# Patient Record
(demographics unavailable — no encounter records)

---

## 2024-11-07 NOTE — PHYSICAL EXAM
[Right] : right foot and ankle [5___] : eversion 5[unfilled]/5 [2+] : dorsalis pedis pulse: 2+ [] : patient ambulates without assistive device [FreeTextEntry3] : mild swelling diffuse skin color change [FreeTextEntry8] : no sig point ttp [TWNoteComboBox7] : dorsiflexion 10 degrees [de-identified] : plantar flexion 30 degrees [de-identified] : inversion 20 degrees [de-identified] : eversion 15 degrees

## 2024-11-07 NOTE — HISTORY OF PRESENT ILLNESS
[Work related] : work related [Sharp] : sharp [Intermittent] : intermittent [Work] : work [Rest] : rest [Walking] : walking [Not working due to injury] : Work status: not working due to injury [de-identified] : WC DOI: 11/07/23 04/09/2024: foot fell through floor 11/7/23 w/ ongoing ankle and knee pain. treated in boot initially. has been going to PT. no prior foot probs. denies dm/tob. ANNA pino -- out from work currently  04/24/2024:  no change. here to review mri images. walking in boot. out from work  05/15/2024:  no improvement. no relief from boot. now walking in reg shoes. not working. having locking at times  06/19/2024:  R ankle scope, excision osteochondral defect  06/19/2024:  no complaints. nwb in splint. Patient denies fevers, chills, or drainage.  07/03/2024: improving. weight bearing at times. Patient denies fevers, chills, or drainage. taking asa.   07/24/2024:  improving.  Attending PT.  PWB in boot w/ crutches. Patient denies fevers, chills, or drainage.  08/22/2024:  no sig change. using brace/crutches. going to PT. Patient denies fevers, chills, or drainage.  9/19/2024: Improving. walking in brace w/ cane. going to PT. working light duty. Patient denies fevers, chills, or drainage.  11/07/2024:  improving.  Attending PT.  Walking in brace.  Light duty at work. going to PT. using cane at times [] : Post Surgical Visit: no [FreeTextEntry1] : R ankle  [FreeTextEntry3] : 11/07/223 [de-identified] :

## 2024-11-07 NOTE — ASSESSMENT
[FreeTextEntry1] : wbat -- discussed disuse osteopenia aircast continue PT rec pain management eval for crps working light duty f/up 6 wks w/ ankle xray

## 2024-11-07 NOTE — WORK
[Sprain/Strain] : sprain/strain [Was the competent medical cause of the injury] : was the competent medical cause of the injury [Are consistent with the injury] : are consistent with the injury [Consistent with my objective findings] : consistent with my objective findings [Partial] : partial [Does not reveal pre-existing condition(s) that may affect treatment/prognosis] : does not reveal pre-existing condition(s) that may affect treatment/prognosis [Other: ___] : [unfilled] [Can return to work with limitations on ______] : can return to work with limitations on [unfilled] [N/A] : : Not Applicable [Patient] : patient [No Rx restrictions] : No Rx restrictions. [I provided the services listed above] :  I provided the services listed above. [FreeTextEntry1] : good

## 2024-12-10 NOTE — HISTORY OF PRESENT ILLNESS
[Work related] : work related [Burning] : burning [Dull/Aching] : dull/aching [Sharp] : sharp [Tingling] : tingling [] : yes [Constant] : constant [Household chores] : household chores [Social interactions] : social interactions [FreeTextEntry1] : rt ankle  [FreeTextEntry3] : 11/7/23 [FreeTextEntry7] : big toe  [de-identified] : touching ankle

## 2024-12-10 NOTE — DISCUSSION/SUMMARY
[de-identified] : After discussing various treatment options with the patient including but not limited to oral medications, physical therapy, exercise modalities as well as interventional spinal injections, we have decided with the following plan:  - Continue home exercises, stretching, activity modification, physical therapy, and conservative care. - Follow-up as needed. - Recommend Gabapentin 300mg BID. Recommend to titrate up gabapentin slowly - first take one pill at night for 3 days and then increase to twice daily. Pt instructed not to drive or operate heavy machinery while on medications. - Recommend Vitamin C 500mg once a day for suspicion of CRPS. - Will be having EMG this week.  - Discussed possible lumbar sympathetic block.

## 2024-12-10 NOTE — PHYSICAL EXAM
[de-identified] : Constitutional; Appears well, no apparent distress Ability to communicate: Normal  Respiratory: non-labored breathing Skin: No rash noted Head: Normocephalic, atraumatic Neck: no visible thyroid enlargement Eyes: Extraocular movements intact Neurologic: Alert and oriented x3 Psychiatric: normal mood, affect and behavior Right Foot: no sweeling, no erythemia, No temperature change, no trophic change, +allodynia

## 2024-12-10 NOTE — PHYSICAL EXAM
[de-identified] : Constitutional; Appears well, no apparent distress Ability to communicate: Normal  Respiratory: non-labored breathing Skin: No rash noted Head: Normocephalic, atraumatic Neck: no visible thyroid enlargement Eyes: Extraocular movements intact Neurologic: Alert and oriented x3 Psychiatric: normal mood, affect and behavior Right Foot: no sweeling, no erythemia, No temperature change, no trophic change, +allodynia

## 2024-12-10 NOTE — HISTORY OF PRESENT ILLNESS
[Work related] : work related [Burning] : burning [Dull/Aching] : dull/aching [Sharp] : sharp [Tingling] : tingling [] : yes [Constant] : constant [Household chores] : household chores [Social interactions] : social interactions [FreeTextEntry1] : rt ankle  [FreeTextEntry3] : 11/7/23 [FreeTextEntry7] : big toe  [de-identified] : touching ankle

## 2024-12-10 NOTE — DISCUSSION/SUMMARY
[de-identified] : After discussing various treatment options with the patient including but not limited to oral medications, physical therapy, exercise modalities as well as interventional spinal injections, we have decided with the following plan:  - Continue home exercises, stretching, activity modification, physical therapy, and conservative care. - Follow-up as needed. - Recommend Gabapentin 300mg BID. Recommend to titrate up gabapentin slowly - first take one pill at night for 3 days and then increase to twice daily. Pt instructed not to drive or operate heavy machinery while on medications. - Recommend Vitamin C 500mg once a day for suspicion of CRPS. - Will be having EMG this week.  - Discussed possible lumbar sympathetic block.

## 2024-12-19 NOTE — ASSESSMENT
[FreeTextEntry1] : wbat  [employer] continue PT seeing pain management, considering possible injection going to PT light duty

## 2024-12-19 NOTE — PHYSICAL EXAM
[Right] : right foot and ankle [5___] : eversion 5[unfilled]/5 [2+] : dorsalis pedis pulse: 2+ [] : no calf tenderness [FreeTextEntry3] : mild swelling diffuse skin color change [FreeTextEntry8] : no sig point ttp [TWNoteComboBox7] : dorsiflexion 10 degrees [de-identified] : plantar flexion 30 degrees [de-identified] : inversion 20 degrees [de-identified] : eversion 15 degrees

## 2024-12-19 NOTE — HISTORY OF PRESENT ILLNESS
[Work related] : work related [Sharp] : sharp [Intermittent] : intermittent [Work] : work [Rest] : rest [Walking] : walking [Not working due to injury] : Work status: not working due to injury [de-identified] : WC DOI: 11/07/23 04/09/2024: foot fell through floor 11/7/23 w/ ongoing ankle and knee pain. treated in boot initially. has been going to PT. no prior foot probs. denies dm/tob. ANNA pino -- out from work currently  04/24/2024:  no change. here to review mri images. walking in boot. out from work  05/15/2024:  no improvement. no relief from boot. now walking in reg shoes. not working. having locking at times  06/19/2024:  R ankle scope, excision osteochondral defect  06/19/2024:  no complaints. nwb in splint. Patient denies fevers, chills, or drainage.  07/03/2024: improving. weight bearing at times. Patient denies fevers, chills, or drainage. taking asa.   07/24/2024:  improving.  Attending PT.  PWB in boot w/ crutches. Patient denies fevers, chills, or drainage.  08/22/2024:  no sig change. using brace/crutches. going to PT. Patient denies fevers, chills, or drainage.  9/19/2024: Improving. walking in brace w/ cane. going to PT. working light duty. Patient denies fevers, chills, or drainage.  11/07/2024:  improving.  Attending PT.  Walking in brace.  Light duty at work. going to PT. using cane at times  12/19/2024: mild improvement. walking in regular shoes w/ brace. saw pain management -- started gabapentin, but making drowsy. had emg that showed. mild peroneal neuropathy. light duty. going to PT.  [] : Post Surgical Visit: no [FreeTextEntry1] : R ankle  [FreeTextEntry3] : 11/07/223 [de-identified] :

## 2025-02-27 NOTE — PHYSICAL EXAM
[Right] : right foot and ankle [5___] : eversion 5[unfilled]/5 [2+] : dorsalis pedis pulse: 2+ [] : no calf tenderness [NL (40)] : plantar flexion 40 degrees [FreeTextEntry3] : min swelling diffuse skin color change [FreeTextEntry8] : mild anterolat ttp [TWNoteComboBox7] : dorsiflexion 15 degrees [de-identified] : inversion 20 degrees [de-identified] : eversion 15 degrees

## 2025-02-27 NOTE — ASSESSMENT
[FreeTextEntry1] : wbat  brace for comfort continue PT seeing pain management f/up 8 wks ' trial of voltaren  The patient's current medication management of their orthopedic diagnosis was reviewed today:  (1) We discussed a comprehensive treatment plan that included possible pharmaceutical management involving the use of prescription strength medications including but not limited to options such as oral Naprosyn 500mg BID, once daily Meloxicam 15 mg, or 500-650 mg Tylenol versus over the counter oral medications and topical prescription NSAID Pennsaid vs over the counter Voltaren gel. (2) There is a moderate risk of morbidity with further treatment, especially from use of prescription strength medications and possible side effects of these medications which include upset stomach with oral medications, skin reactions to topical medications and cardiac/renal issues with long term use. (3) I recommended that the patient follow-up with their medical physician to discuss any significant specific potential issues with long term medication use such as interactions with current medications or with exacerbation of underlying medical comorbidities. (4) The benefits and risks associated with use of injectable, oral or topical, prescription and over the counter anti-inflammatory medications were discussed with the patient. The patient voiced understanding of the risks including but not limited to bleeding, stroke, kidney dysfunction, heart disease, and were referred to the black box warning label for further information.

## 2025-02-27 NOTE — HISTORY OF PRESENT ILLNESS
[Work related] : work related [Sharp] : sharp [Intermittent] : intermittent [Work] : work [Rest] : rest [Walking] : walking [Not working due to injury] : Work status: not working due to injury [de-identified] : WC DOI: 11/07/23 04/09/2024: foot fell through floor 11/7/23 w/ ongoing ankle and knee pain. treated in boot initially. has been going to PT. no prior foot probs. denies dm/tob. ANNA pino -- out from work currently  04/24/2024:  no change. here to review mri images. walking in boot. out from work  05/15/2024:  no improvement. no relief from boot. now walking in reg shoes. not working. having locking at times  06/19/2024:  R ankle scope, excision osteochondral defect  06/19/2024:  no complaints. nwb in splint. Patient denies fevers, chills, or drainage.  07/03/2024: improving. weight bearing at times. Patient denies fevers, chills, or drainage. taking asa.   07/24/2024:  improving.  Attending PT.  PWB in boot w/ crutches. Patient denies fevers, chills, or drainage.  08/22/2024:  no sig change. using brace/crutches. going to PT. Patient denies fevers, chills, or drainage.  9/19/2024: Improving. walking in brace w/ cane. going to PT. working light duty. Patient denies fevers, chills, or drainage.  11/07/2024:  improving.  Attending PT.  Walking in brace.  Light duty at work. going to PT. using cane at times  12/19/2024: mild improvement. walking in regular shoes w/ brace. saw pain management -- started gabapentin, but making drowsy. had emg that showed. mild peroneal neuropathy. light duty. going to PT.   02/27/2025: some improvement. going to PT. no longer taking gabapentin. light duty [] : Post Surgical Visit: no [FreeTextEntry1] : R ankle  [FreeTextEntry3] : 11/07/223 [de-identified] :

## 2025-03-27 NOTE — ASSESSMENT
[FreeTextEntry1] : appears improved on mri rec f/up pain management for ongoing nerve symptoms may increase activity as cynthia remains light duty going to PT f/up 2 months

## 2025-03-27 NOTE — WORK
[Sprain/Strain] : sprain/strain [Was the competent medical cause of the injury] : was the competent medical cause of the injury [Are consistent with the injury] : are consistent with the injury [Consistent with my objective findings] : consistent with my objective findings [Partial] : partial [Does not reveal pre-existing condition(s) that may affect treatment/prognosis] : does not reveal pre-existing condition(s) that may affect treatment/prognosis [Can return to work with limitations on ______] : can return to work with limitations on [unfilled] [N/A] : : Not Applicable [Patient] : patient [No Rx restrictions] : No Rx restrictions. [I provided the services listed above] :  I provided the services listed above. [FreeTextEntry1] : good

## 2025-03-27 NOTE — DATA REVIEWED
[MRI] : MRI [Right] : of the right [Ankle] : ankle [I reviewed the films/CD and additionally noted] : I reviewed the films/CD and additionally noted [FreeTextEntry1] : resolution of ocd/marrow edema -- eliceoanger

## 2025-03-27 NOTE — PHYSICAL EXAM
[Right] : right foot and ankle [NL (40)] : plantar flexion 40 degrees [5___] : eversion 5[unfilled]/5 [2+] : dorsalis pedis pulse: 2+ [] : no calf tenderness [FreeTextEntry3] : min swelling diffuse skin color change [FreeTextEntry8] : mild anterolat ttp [TWNoteComboBox7] : dorsiflexion 15 degrees [de-identified] : inversion 20 degrees [de-identified] : eversion 15 degrees

## 2025-03-27 NOTE — HISTORY OF PRESENT ILLNESS
[Work related] : work related [Sharp] : sharp [Intermittent] : intermittent [Work] : work [Rest] : rest [Walking] : walking [Not working due to injury] : Work status: not working due to injury [de-identified] : WC DOI: 11/07/23 04/09/2024: foot fell through floor 11/7/23 w/ ongoing ankle and knee pain. treated in boot initially. has been going to PT. no prior foot probs. denies dm/tob. ANNA pino -- out from work currently  04/24/2024:  no change. here to review mri images. walking in boot. out from work  05/15/2024:  no improvement. no relief from boot. now walking in reg shoes. not working. having locking at times  06/19/2024:  R ankle scope, excision osteochondral defect  06/19/2024:  no complaints. nwb in splint. Patient denies fevers, chills, or drainage.  07/03/2024: improving. weight bearing at times. Patient denies fevers, chills, or drainage. taking asa.   07/24/2024:  improving.  Attending PT.  PWB in boot w/ crutches. Patient denies fevers, chills, or drainage.  08/22/2024:  no sig change. using brace/crutches. going to PT. Patient denies fevers, chills, or drainage.  9/19/2024: Improving. walking in brace w/ cane. going to PT. working light duty. Patient denies fevers, chills, or drainage.  11/07/2024:  improving.  Attending PT.  Walking in brace.  Light duty at work. going to PT. using cane at times  12/19/2024: mild improvement. walking in regular shoes w/ brace. saw pain management -- started gabapentin, but making drowsy. had emg that showed. mild peroneal neuropathy. light duty. going to PT.   02/27/2025: some improvement. going to PT. no longer taking gabapentin. light duty  03/27/2025:  mri f/up no sig change. walking in reg shoes. going to PT. working light duty. sx related to peroneal neuropathy [] : Post Surgical Visit: no [FreeTextEntry1] : R ankle  [FreeTextEntry3] : 11/07/223 [de-identified] :

## 2025-05-12 NOTE — ASSESSMENT
[FreeTextEntry1] : 50 year M presents with pain in the right foot consistent with CRPS potentially.  Right foot CRPS, would like to try conservative management first, can consider LSB in the future.  A discussion regarding available pain management treatment options occurred with the patient.  These included interventional, rehabilitative, pharmacological, and alternative modalities. We will proceed with the following:   Interventional treatment options: - None indicated at present time  - see additional instructions below      Rehabilitative options: - continue physical therapy - Initiate desensitization therapy - participation in active HEP was discussed and instructions provided   Medication based treatment options: - initiate trial of Lyrica 75mg BID - Initiate lidocaine ointment over the right foot   Complementary treatment options: - Weight management and lifestyle modifications discussed    Additional treatment recommendations as follows: - patient to follow up in four weeks  Patient is presenting with acute/sub-acute pain with impairment in ADLs and functionality. The pain has not responded to at least 6 weeks of conservative care including NSAID therapy, OTC analgesics and/or physical therapy and/or home exercise program within the last 3 months.   The risks, benefits and alternatives of medication were discussed with patient. Patient expressed understanding and would like to proceed.   The above documented care represents medical care services that are part of ongoing care related to this patient's serious or complex condition.

## 2025-05-12 NOTE — PHYSICAL EXAM
[de-identified] : General: Appears well nourished and well developed, no acute distress Musculoskeletal: Gait is non-antalgic, patient is ambulating without assistance Right Foot Exam:                       Inspection:    erythema (+)   edema (+)   rashes (-)                         ROM:    ROM - full range of motion with mild stiffness                           Strength Testin/5 in the bilateral lower extremities EXCEPT R EHL and Dorsiflexion 4/5                      Reflexes: 2/2 in the bilateral lower extremities                      Sensation: Sensation to light touch grossly intact in the bilateral lower extremities                      Special Tests:  +Edema, + Weakness, +Color changes, Decreased ROM, + Nail changes

## 2025-05-12 NOTE — HISTORY OF PRESENT ILLNESS
[Lower back] : lower back [Work related] : work related [6] : 6 [Burning] : burning [Dull/Aching] : dull/aching [Sharp] : sharp [Tingling] : tingling [] : yes [Intermittent] : intermittent [Household chores] : household chores [Social interactions] : social interactions [Standing] : standing [Walking] : walking [FreeTextEntry1] : rt ankle  [FreeTextEntry3] : 11/7/23 [FreeTextEntry7] : big toe  [de-identified] : touching ankle, driving

## 2025-05-12 NOTE — HISTORY OF PRESENT ILLNESS
[Lower back] : lower back [Work related] : work related [6] : 6 [Burning] : burning [Dull/Aching] : dull/aching [Sharp] : sharp [Tingling] : tingling [] : yes [Intermittent] : intermittent [Household chores] : household chores [Social interactions] : social interactions [Standing] : standing [Walking] : walking [FreeTextEntry1] : rt ankle  [FreeTextEntry3] : 11/7/23 [FreeTextEntry7] : big toe  [de-identified] : touching ankle, driving

## 2025-05-12 NOTE — PHYSICAL EXAM
[de-identified] : General: Appears well nourished and well developed, no acute distress Musculoskeletal: Gait is non-antalgic, patient is ambulating without assistance Right Foot Exam:                       Inspection:    erythema (+)   edema (+)   rashes (-)                         ROM:    ROM - full range of motion with mild stiffness                           Strength Testin/5 in the bilateral lower extremities EXCEPT R EHL and Dorsiflexion 4/5                      Reflexes: 2/2 in the bilateral lower extremities                      Sensation: Sensation to light touch grossly intact in the bilateral lower extremities                      Special Tests:  +Edema, + Weakness, +Color changes, Decreased ROM, + Nail changes